# Patient Record
Sex: MALE
[De-identification: names, ages, dates, MRNs, and addresses within clinical notes are randomized per-mention and may not be internally consistent; named-entity substitution may affect disease eponyms.]

---

## 2020-12-03 ENCOUNTER — NURSE TRIAGE (OUTPATIENT)
Dept: OTHER | Facility: CLINIC | Age: 35
End: 2020-12-03

## 2023-07-14 ENCOUNTER — NURSE TRIAGE (OUTPATIENT)
Dept: OTHER | Facility: CLINIC | Age: 38
End: 2023-07-14

## 2023-07-14 NOTE — TELEPHONE ENCOUNTER
Location of patient: [place]    Subjective: Caller states \"chest tightness\"     Current Symptoms: Starting Tuesday with chest tightness. Had to put down 15year old dog Tuesday so was stressed. Still having some tightness in chest   Today on left leg having some tingling, also in foot. No cardiac history  BP over past couple days-- 137/88, 133/79  Denies pain in arm or jaw  Feels in middle of chest  No illness    Onset: 3 days ago; gradual    Associated Symptoms: NA    Pain Severity: not describing as pain but more as tightness    Temperature: denies     What has been tried:     LMP: NA Pregnant: NA    Recommended disposition: Go to ED/[facility] Now (Or to Office with PCP Approval)    Care advice provided, patient verbalizes understanding; denies any other questions or concerns; instructed to call back for any new or worsening symptoms. Patient/caller agrees to proceed to nearest [facility]     This triage is a result of a call to [address] [place] [address]. Please do not respond to the triage nurse through this encounter. Any subsequent communication should be directly with the patient.     Reason for Disposition   Chest pain lasting longer than 5 minutes and occurred in last 3 days (72 hours) (Exception: feels exactly the same as previously diagnosed heartburn and has accompanying sour taste in mouth)    Protocols used: Chest Pain-ADULT-[place]